# Patient Record
Sex: FEMALE | Race: WHITE | NOT HISPANIC OR LATINO
[De-identification: names, ages, dates, MRNs, and addresses within clinical notes are randomized per-mention and may not be internally consistent; named-entity substitution may affect disease eponyms.]

---

## 2020-03-09 ENCOUNTER — APPOINTMENT (OUTPATIENT)
Dept: VASCULAR SURGERY | Facility: CLINIC | Age: 75
End: 2020-03-09
Payer: COMMERCIAL

## 2020-03-09 DIAGNOSIS — L03.116 CELLULITIS OF LEFT LOWER LIMB: ICD-10-CM

## 2020-03-09 DIAGNOSIS — Z86.39 PERSONAL HISTORY OF OTHER ENDOCRINE, NUTRITIONAL AND METABOLIC DISEASE: ICD-10-CM

## 2020-03-09 DIAGNOSIS — R60.0 LOCALIZED EDEMA: ICD-10-CM

## 2020-03-09 PROCEDURE — 99203 OFFICE O/P NEW LOW 30 MIN: CPT

## 2020-03-09 PROCEDURE — 93971 EXTREMITY STUDY: CPT

## 2020-03-09 RX ORDER — AMOXICILLIN AND CLAVULANATE POTASSIUM 875; 125 MG/1; MG/1
875-125 TABLET, COATED ORAL
Qty: 14 | Refills: 0 | Status: ACTIVE | COMMUNITY
Start: 2020-03-09 | End: 1900-01-01

## 2020-03-11 PROBLEM — Z86.39 HISTORY OF DIABETES MELLITUS: Status: RESOLVED | Noted: 2020-03-11 | Resolved: 2020-03-11

## 2020-03-11 PROBLEM — R60.0 LEG EDEMA, LEFT: Status: ACTIVE | Noted: 2020-03-11

## 2020-03-13 NOTE — HISTORY OF PRESENT ILLNESS
[FreeTextEntry1] : 75 y/o F with PMHx of HLD, HTN, T2DM presents today for LLE swelling. Patient states that she noticed left leg swelling few months ago and it hasn't resolved. Recently she noticed skin redness and tenderness to touch. She denies trauma to her leg, history of DVT. She ambulates without difficulties, but experiences discomfort due to her edema.\par

## 2020-03-13 NOTE — PHYSICAL EXAM
[Normal Breath Sounds] : Normal breath sounds [Normal Heart Sounds] : normal heart sounds [Normal Rate and Rhythm] : normal rate and rhythm [No Rash or Lesion] : No rash or lesion [Alert] : alert [Oriented to Person] : oriented to person [Oriented to Place] : oriented to place [Oriented to Time] : oriented to time [Calm] : calm [2+] : left 2+ [Ankle Swelling (On Exam)] : present [Ankle Swelling On The Left] : moderate [JVD] : no jugular venous distention  [de-identified] : Obese habitus, NAD [de-identified] : NCAT [de-identified] : +FROM B/L [de-identified] : LLE erythema

## 2020-03-13 NOTE — PROCEDURE
[FreeTextEntry1] : LLE Venous Duplex demonstrates no evidence of DVT. Difficult to visualize calf veins due to swelling.\par \par Neosporin applied throughout LLE in the office today.

## 2020-03-13 NOTE — ASSESSMENT
[FreeTextEntry1] : 75 y/o F with LLE edema and skin erythema suggestive of cellulitis.\par Neosporin was applied throughout LLE in the office today. Patient should moisturize her L leg daily. Will give patient Augmentin. Patient will follow-up here in 2 weeks. [Arterial/Venous Disease] : arterial/venous disease

## 2020-03-27 ENCOUNTER — APPOINTMENT (OUTPATIENT)
Dept: VASCULAR SURGERY | Facility: CLINIC | Age: 75
End: 2020-03-27

## 2023-01-28 ENCOUNTER — OFFICE (OUTPATIENT)
Dept: URBAN - METROPOLITAN AREA CLINIC 109 | Facility: CLINIC | Age: 78
Setting detail: OPHTHALMOLOGY
End: 2023-01-28
Payer: COMMERCIAL

## 2023-01-28 DIAGNOSIS — H25.13: ICD-10-CM

## 2023-01-28 PROCEDURE — 99213 OFFICE O/P EST LOW 20 MIN: CPT | Performed by: OPHTHALMOLOGY

## 2023-01-28 ASSESSMENT — REFRACTION_CURRENTRX
OD_CYLINDER: -1.50
OD_CYLINDER: -1.50
OS_CYLINDER: -0.75
OD_OVR_VA: 20/
OS_OVR_VA: 20/
OD_OVR_VA: 20/
OS_AXIS: 3
OS_SPHERE: +2.25
OD_SPHERE: +1.50
OS_SPHERE: PLANO
OD_AXIS: 96
OD_SPHERE: +4.25
OS_OVR_VA: 20/
OS_CYLINDER: -1.00
OS_AXIS: 14
OD_AXIS: 94

## 2023-01-28 ASSESSMENT — REFRACTION_AUTOREFRACTION
OD_CYLINDER: -1.75
OD_SPHERE: +0.50
OS_SPHERE: +1.25
OD_AXIS: 099
OS_CYLINDER: -0.75
OS_AXIS: 065

## 2023-01-28 ASSESSMENT — SPHEQUIV_DERIVED
OS_SPHEQUIV: 0.875
OD_SPHEQUIV: -0.375

## 2023-01-28 ASSESSMENT — TONOMETRY
OD_IOP_MMHG: 17
OS_IOP_MMHG: 17

## 2023-01-28 ASSESSMENT — CONFRONTATIONAL VISUAL FIELD TEST (CVF)
OS_FINDINGS: FULL
OD_FINDINGS: FULL

## 2023-01-28 ASSESSMENT — VISUAL ACUITY
OD_BCVA: 20/30+2
OS_BCVA: 20/30

## 2024-02-23 ENCOUNTER — OFFICE (OUTPATIENT)
Dept: URBAN - METROPOLITAN AREA CLINIC 109 | Facility: CLINIC | Age: 79
Setting detail: OPHTHALMOLOGY
End: 2024-02-23
Payer: COMMERCIAL

## 2024-02-23 DIAGNOSIS — E11.9: ICD-10-CM

## 2024-02-23 DIAGNOSIS — H25.13: ICD-10-CM

## 2024-02-23 PROCEDURE — 92014 COMPRE OPH EXAM EST PT 1/>: CPT | Performed by: OPHTHALMOLOGY

## 2024-02-23 ASSESSMENT — CONFRONTATIONAL VISUAL FIELD TEST (CVF)
OS_FINDINGS: FULL
OD_FINDINGS: FULL

## 2024-02-23 ASSESSMENT — REFRACTION_CURRENTRX
OD_SPHERE: +1.00
OD_SPHERE: +4.25
OD_OVR_VA: 20/
OS_SPHERE: +1.00
OS_OVR_VA: 20/
OD_CYLINDER: -1.50
OS_CYLINDER: -0.75
OD_AXIS: 94
OS_OVR_VA: 20/
OS_AXIS: 14
OD_OVR_VA: 20/
OS_AXIS: 3
OS_SPHERE: +2.25

## 2024-02-23 ASSESSMENT — REFRACTION_AUTOREFRACTION
OD_AXIS: 102
OD_CYLINDER: -2.50
OD_SPHERE: +0.25
OS_CYLINDER: -1.25
OS_SPHERE: +1.00
OS_AXIS: 78

## 2024-02-23 ASSESSMENT — SPHEQUIV_DERIVED
OD_SPHEQUIV: -1
OS_SPHEQUIV: 0.375

## 2024-03-15 ENCOUNTER — NON-APPOINTMENT (OUTPATIENT)
Age: 79
End: 2024-03-15

## 2025-03-14 ENCOUNTER — NON-APPOINTMENT (OUTPATIENT)
Age: 80
End: 2025-03-14

## 2025-03-15 ENCOUNTER — NON-APPOINTMENT (OUTPATIENT)
Age: 80
End: 2025-03-15